# Patient Record
Sex: MALE | URBAN - METROPOLITAN AREA
[De-identification: names, ages, dates, MRNs, and addresses within clinical notes are randomized per-mention and may not be internally consistent; named-entity substitution may affect disease eponyms.]

---

## 2022-01-25 NOTE — PATIENT DISCUSSION
Cataract Counseling: Not visually significant to proceed with surgery at this time. I have discussed continuing with current spectacles vs updating. I have offerred the patient a new spectacle prescription to fill if desires. Return to follow up as schedled or sooner if symptoms arise. Yes-Patient/Caregiver accepts free interpretation services...

## 2022-07-09 ENCOUNTER — TELEPHONE ENCOUNTER (OUTPATIENT)
Dept: URBAN - METROPOLITAN AREA CLINIC 121 | Facility: CLINIC | Age: 62
End: 2022-07-09

## 2022-07-10 ENCOUNTER — TELEPHONE ENCOUNTER (OUTPATIENT)
Dept: URBAN - METROPOLITAN AREA CLINIC 121 | Facility: CLINIC | Age: 62
End: 2022-07-10

## 2022-07-10 RX ORDER — ELECTROLYTES/DEXTROSE
SOLUTION, ORAL ORAL ONCE A DAY
Refills: 0 | Status: ACTIVE | COMMUNITY
Start: 2019-03-13

## 2022-07-10 RX ORDER — OMEGA-3S/DHA/EPA/FISH OIL 980-1400MG
CAPSULE,DELAYED RELEASE (ENTERIC COATED) ORAL ONCE A DAY
Refills: 0 | Status: ACTIVE | COMMUNITY
Start: 2019-03-13

## 2022-07-18 ENCOUNTER — FOLLOW UP (OUTPATIENT)
Dept: URBAN - METROPOLITAN AREA CLINIC 26 | Facility: CLINIC | Age: 62
End: 2022-07-18

## 2022-07-18 VITALS — BODY MASS INDEX: 25.73 KG/M2 | HEIGHT: 72 IN | WEIGHT: 190 LBS

## 2022-07-18 DIAGNOSIS — H33.002: ICD-10-CM

## 2022-07-18 DIAGNOSIS — H43.811: ICD-10-CM

## 2022-07-18 DIAGNOSIS — H04.123: ICD-10-CM

## 2022-07-18 DIAGNOSIS — H35.372: ICD-10-CM

## 2022-07-18 PROCEDURE — 92134 CPTRZ OPH DX IMG PST SGM RTA: CPT

## 2022-07-18 PROCEDURE — 92250 FUNDUS PHOTOGRAPHY W/I&R: CPT

## 2022-07-18 PROCEDURE — 92014 COMPRE OPH EXAM EST PT 1/>: CPT

## 2022-07-18 ASSESSMENT — TONOMETRY
OS_IOP_MMHG: 10
OD_IOP_MMHG: 11

## 2022-07-18 ASSESSMENT — VISUAL ACUITY
OS_CC: 20/25-2
OD_CC: 20/20-1

## 2023-08-14 ENCOUNTER — FOLLOW UP (OUTPATIENT)
Dept: URBAN - METROPOLITAN AREA CLINIC 26 | Facility: CLINIC | Age: 63
End: 2023-08-14

## 2023-08-14 VITALS — HEIGHT: 72 IN | BODY MASS INDEX: 24.79 KG/M2 | WEIGHT: 183 LBS

## 2023-08-14 DIAGNOSIS — H35.372: ICD-10-CM

## 2023-08-14 DIAGNOSIS — H33.002: ICD-10-CM

## 2023-08-14 DIAGNOSIS — H02.836: ICD-10-CM

## 2023-08-14 DIAGNOSIS — H02.833: ICD-10-CM

## 2023-08-14 DIAGNOSIS — H43.811: ICD-10-CM

## 2023-08-14 DIAGNOSIS — H04.123: ICD-10-CM

## 2023-08-14 PROCEDURE — 92134 CPTRZ OPH DX IMG PST SGM RTA: CPT

## 2023-08-14 PROCEDURE — 92014 COMPRE OPH EXAM EST PT 1/>: CPT

## 2023-08-14 PROCEDURE — 92250 FUNDUS PHOTOGRAPHY W/I&R: CPT

## 2023-08-14 ASSESSMENT — TONOMETRY
OS_IOP_MMHG: 14
OD_IOP_MMHG: 8

## 2023-08-14 ASSESSMENT — VISUAL ACUITY
OD_CC: 20/20
OS_CC: 20/25-1

## 2024-08-12 ENCOUNTER — FOLLOW UP (OUTPATIENT)
Dept: URBAN - METROPOLITAN AREA CLINIC 26 | Facility: CLINIC | Age: 64
End: 2024-08-12

## 2024-08-12 VITALS — HEIGHT: 72 IN | WEIGHT: 188 LBS | BODY MASS INDEX: 25.47 KG/M2

## 2024-08-12 DIAGNOSIS — H02.836: ICD-10-CM

## 2024-08-12 DIAGNOSIS — H35.372: ICD-10-CM

## 2024-08-12 DIAGNOSIS — H02.833: ICD-10-CM

## 2024-08-12 DIAGNOSIS — H04.123: ICD-10-CM

## 2024-08-12 DIAGNOSIS — H25.041: ICD-10-CM

## 2024-08-12 DIAGNOSIS — H33.002: ICD-10-CM

## 2024-08-12 DIAGNOSIS — H43.811: ICD-10-CM

## 2024-08-12 PROCEDURE — 92250 FUNDUS PHOTOGRAPHY W/I&R: CPT | Mod: 59

## 2024-08-12 PROCEDURE — 92014 COMPRE OPH EXAM EST PT 1/>: CPT

## 2024-08-12 PROCEDURE — 92134 CPTRZ OPH DX IMG PST SGM RTA: CPT

## 2024-08-12 ASSESSMENT — VISUAL ACUITY
OS_CC: 20/25-2
OD_CC: 20/20

## 2024-08-12 ASSESSMENT — TONOMETRY
OD_IOP_MMHG: 9
OS_IOP_MMHG: 12